# Patient Record
Sex: FEMALE | Race: OTHER
[De-identification: names, ages, dates, MRNs, and addresses within clinical notes are randomized per-mention and may not be internally consistent; named-entity substitution may affect disease eponyms.]

---

## 2017-10-21 ENCOUNTER — HOSPITAL ENCOUNTER (OUTPATIENT)
Dept: HOSPITAL 1 - LB | Age: 37
Discharge: HOME | End: 2017-10-21
Payer: COMMERCIAL

## 2017-10-21 DIAGNOSIS — K76.89: Primary | ICD-10-CM

## 2017-10-21 LAB
CHOLEST SERPL-MCNC: 216 MG/DL (ref ?–200)
CHOLEST/HDLC SERPL: 9.4 MG/DL
HDLC SERPL-MCNC: 23 MG/DL (ref 40–60)
TRIGL SERPL-MCNC: 361 MG/DL (ref ?–150)

## 2018-04-28 ENCOUNTER — HOSPITAL ENCOUNTER (OUTPATIENT)
Dept: HOSPITAL 1 - LB | Age: 38
Discharge: HOME | End: 2018-04-28
Attending: FAMILY MEDICINE
Payer: COMMERCIAL

## 2018-04-28 DIAGNOSIS — F25.1: Primary | ICD-10-CM

## 2018-04-28 DIAGNOSIS — F41.9: ICD-10-CM

## 2018-04-28 LAB
BUN SERPL-MCNC: 10 MG/DL (ref 7–18)
CALCIUM SERPL-MCNC: 8.8 MG/DL (ref 8.5–10.1)
CHLORIDE SERPL-SCNC: 105 MMOL/L (ref 98–107)
CHOLEST SERPL-MCNC: 186 MG/DL (ref ?–200)
CHOLEST/HDLC SERPL: 9.3 MG/DL
CO2 SERPL-SCNC: 28.1 MMOL/L (ref 21–32)
CREAT SERPL-MCNC: 1.2 MG/DL (ref 0.6–1)
GFR SERPLBLD BASED ON 1.73 SQ M-ARVRAT: 53 ML/MIN
GLUCOSE SERPL-MCNC: 108 MG/DL (ref 74–106)
HDLC SERPL-MCNC: 20 MG/DL (ref 40–60)
POTASSIUM SERPL-SCNC: 3.8 MMOL/L (ref 3.5–5.1)
SODIUM SERPL-SCNC: 138 MMOL/L (ref 136–145)
TRIGL SERPL-MCNC: 285 MG/DL (ref ?–150)

## 2018-10-27 ENCOUNTER — HOSPITAL ENCOUNTER (OUTPATIENT)
Dept: HOSPITAL 1 - LB | Age: 38
Discharge: HOME | End: 2018-10-27
Payer: COMMERCIAL

## 2018-10-27 DIAGNOSIS — Z86.39: Primary | ICD-10-CM

## 2018-10-27 LAB
CHOLEST SERPL-MCNC: 176 MG/DL (ref ?–200)
CHOLEST/HDLC SERPL: 7.3 MG/DL
GLUCOSE P FAST SERPL-MCNC: 95 MG/DL (ref 70–110)
HDLC SERPL-MCNC: 24 MG/DL (ref 40–60)
T4 FREE SERPL-MCNC: 1.22 NG/DL (ref 0.76–1.46)
TRIGL SERPL-MCNC: 251 MG/DL (ref ?–150)

## 2018-12-04 ENCOUNTER — HOSPITAL ENCOUNTER (OUTPATIENT)
Dept: HOSPITAL 26 - MLB | Age: 38
Discharge: HOME | End: 2018-12-04
Payer: COMMERCIAL

## 2018-12-04 DIAGNOSIS — D21.9: Primary | ICD-10-CM

## 2018-12-04 LAB
BUN SERPL-MCNC: 11 MG/DL (ref 7–18)
CREAT SERPL-MCNC: 0.8 MG/DL (ref 0.6–1.3)

## 2019-02-02 ENCOUNTER — HOSPITAL ENCOUNTER (OUTPATIENT)
Dept: HOSPITAL 26 - MLB | Age: 39
Discharge: HOME | End: 2019-02-02
Payer: COMMERCIAL

## 2019-02-02 DIAGNOSIS — N83.201: Primary | ICD-10-CM

## 2019-02-03 LAB
ESTRADIOL SERPL-MCNC: 11.2 PG/ML
FSH SERPL-ACNC: 5.9 MIU/ML

## 2019-05-18 ENCOUNTER — HOSPITAL ENCOUNTER (OUTPATIENT)
Dept: HOSPITAL 26 - MLB | Age: 39
Discharge: HOME | End: 2019-05-18
Payer: COMMERCIAL

## 2019-05-18 DIAGNOSIS — F41.9: ICD-10-CM

## 2019-05-18 DIAGNOSIS — F25.1: Primary | ICD-10-CM

## 2019-05-18 LAB
ALBUMIN FLD-MCNC: 3.8 G/DL (ref 3.4–5)
ANION GAP SERPL CALCULATED.3IONS-SCNC: 13.6 MMOL/L (ref 8–16)
AST SERPL-CCNC: 43 U/L (ref 15–37)
BILIRUB SERPL-MCNC: 0.5 MG/DL (ref 0–1)
BUN SERPL-MCNC: 11 MG/DL (ref 7–18)
CHLORIDE SERPL-SCNC: 103 MMOL/L (ref 98–107)
CHOLEST/HDLC SERPL: 8.9 {RATIO} (ref 1–4.5)
CO2 SERPL-SCNC: 26.5 MMOL/L (ref 21–32)
CREAT SERPL-MCNC: 0.9 MG/DL (ref 0.6–1.3)
GFR SERPL CREATININE-BSD FRML MDRD: 90 ML/MIN (ref 90–?)
GLUCOSE SERPL-MCNC: 100 MG/DL (ref 74–106)
HDLC SERPL-MCNC: 26 MG/DL (ref 40–60)
LDLC SERPL CALC-MCNC: 120 MG/DL (ref 60–100)
POTASSIUM SERPL-SCNC: 4.1 MMOL/L (ref 3.5–5.1)
SODIUM SERPL-SCNC: 139 MMOL/L (ref 136–145)
TRIGL SERPL-MCNC: 425 MG/DL (ref 30–150)
TSH SERPL DL<=0.05 MIU/L-ACNC: 1.72 UIU/ML (ref 0.34–3.74)

## 2019-09-28 ENCOUNTER — HOSPITAL ENCOUNTER (OUTPATIENT)
Dept: HOSPITAL 26 - MLB | Age: 39
Discharge: HOME | End: 2019-09-28
Payer: COMMERCIAL

## 2019-09-28 DIAGNOSIS — F25.1: Primary | ICD-10-CM

## 2019-09-28 DIAGNOSIS — F41.9: ICD-10-CM

## 2019-09-28 LAB
CHOLEST/HDLC SERPL: 8.1 {RATIO} (ref 1–4.5)
HDLC SERPL-MCNC: 26 MG/DL (ref 40–60)
LDLC SERPL CALC-MCNC: 133 MG/DL (ref 60–100)
TRIGL SERPL-MCNC: 259 MG/DL (ref 30–150)

## 2019-10-22 ENCOUNTER — HOSPITAL ENCOUNTER (EMERGENCY)
Dept: HOSPITAL 26 - MED | Age: 39
Discharge: HOME | End: 2019-10-22
Payer: COMMERCIAL

## 2019-10-22 VITALS — DIASTOLIC BLOOD PRESSURE: 86 MMHG | SYSTOLIC BLOOD PRESSURE: 132 MMHG

## 2019-10-22 VITALS — SYSTOLIC BLOOD PRESSURE: 132 MMHG | DIASTOLIC BLOOD PRESSURE: 86 MMHG

## 2019-10-22 VITALS — HEIGHT: 68 IN | BODY MASS INDEX: 24.25 KG/M2 | WEIGHT: 160 LBS

## 2019-10-22 DIAGNOSIS — R03.0: ICD-10-CM

## 2019-10-22 DIAGNOSIS — B34.9: Primary | ICD-10-CM

## 2019-10-22 DIAGNOSIS — Z79.1: ICD-10-CM

## 2019-10-22 DIAGNOSIS — Z79.899: ICD-10-CM

## 2019-10-22 NOTE — NUR
PT ARRIVED TO ED C/O COLD SX; BODY ACHES, COUGH, RUNNY NOSE X 3DAYS. PT RATES 
PAIN 3/10 AND DESCRIBES IT AS BODY ACHES. VSS. A & O X4. NOSE IS CONGESTED, 
PRODUCTIVE COUGH. NAUSEA. LUNG SOUNDS CLEAR THROUGHOUT. NO RESP DISTRESS.

PATIENT POSITIONED FOR COMFORT; HOB ELEVATED; BEDRAILS UP X2; BED DOWN. ER MD 
MADE AWARE OF PT STATUS.



NKA.



PMH: SCHIZO, DEPRESSION.

## 2020-01-29 ENCOUNTER — HOSPITAL ENCOUNTER (OUTPATIENT)
Dept: HOSPITAL 26 - MLB | Age: 40
Discharge: HOME | End: 2020-01-29
Payer: COMMERCIAL

## 2020-01-29 DIAGNOSIS — F41.9: Primary | ICD-10-CM

## 2020-01-29 DIAGNOSIS — F25.1: ICD-10-CM

## 2020-01-29 LAB
CHOLEST/HDLC SERPL: 5.9 {RATIO} (ref 1–4.5)
GLUCOSE P FAST SERPL-MCNC: 110 MG/DL (ref 83–110)
HDLC SERPL-MCNC: 31 MG/DL (ref 40–60)
LDLC SERPL CALC-MCNC: 113 MG/DL (ref 60–100)
TRIGL SERPL-MCNC: 197 MG/DL (ref 30–150)

## 2020-07-30 ENCOUNTER — HOSPITAL ENCOUNTER (OUTPATIENT)
Dept: HOSPITAL 26 - MLB | Age: 40
Discharge: HOME | End: 2020-07-30
Payer: COMMERCIAL

## 2020-07-30 DIAGNOSIS — F25.1: ICD-10-CM

## 2020-07-30 DIAGNOSIS — F41.9: Primary | ICD-10-CM

## 2020-07-30 LAB
ALBUMIN FLD-MCNC: 3.8 G/DL (ref 3.4–5)
ANION GAP SERPL CALCULATED.3IONS-SCNC: 15 MMOL/L (ref 8–16)
AST SERPL-CCNC: 25 U/L (ref 15–37)
BASOPHILS # BLD AUTO: 0.1 K/UL (ref 0–0.22)
BASOPHILS NFR BLD AUTO: 0.8 % (ref 0–2)
BILIRUB SERPL-MCNC: 0.3 MG/DL (ref 0–1)
BUN SERPL-MCNC: 9 MG/DL (ref 7–18)
CHLORIDE SERPL-SCNC: 105 MMOL/L (ref 98–107)
CHOLEST/HDLC SERPL: 7.6 {RATIO} (ref 1–4.5)
CO2 SERPL-SCNC: 23.8 MMOL/L (ref 21–32)
CREAT SERPL-MCNC: 0.9 MG/DL (ref 0.6–1.3)
EOSINOPHIL # BLD AUTO: 0.1 K/UL (ref 0–0.4)
EOSINOPHIL NFR BLD AUTO: 1.9 % (ref 0–4)
ERYTHROCYTE [DISTWIDTH] IN BLOOD BY AUTOMATED COUNT: 13.9 % (ref 11.6–13.7)
GFR SERPL CREATININE-BSD FRML MDRD: 89 ML/MIN (ref 90–?)
GLUCOSE SERPL-MCNC: 138 MG/DL (ref 74–106)
HCT VFR BLD AUTO: 36 % (ref 36–48)
HDLC SERPL-MCNC: 22 MG/DL (ref 40–60)
HGB BLD-MCNC: 11.9 G/DL (ref 12–16)
LDLC SERPL CALC-MCNC: 103 MG/DL (ref 60–100)
LYMPHOCYTES # BLD AUTO: 1.5 K/UL (ref 2.5–16.5)
LYMPHOCYTES NFR BLD AUTO: 22.3 % (ref 20.5–51.1)
MCH RBC QN AUTO: 28 PG (ref 27–31)
MCHC RBC AUTO-ENTMCNC: 33 G/DL (ref 33–37)
MCV RBC AUTO: 85.2 FL (ref 80–94)
MONOCYTES # BLD AUTO: 0.5 K/UL (ref 0.8–1)
MONOCYTES NFR BLD AUTO: 7.8 % (ref 1.7–9.3)
NEUTROPHILS # BLD AUTO: 4.4 K/UL (ref 1.8–7.7)
NEUTROPHILS NFR BLD AUTO: 67.2 % (ref 42.2–75.2)
PLATELET # BLD AUTO: 212 K/UL (ref 140–450)
POTASSIUM SERPL-SCNC: 3.8 MMOL/L (ref 3.5–5.1)
RBC # BLD AUTO: 4.22 MIL/UL (ref 4.2–5.4)
SODIUM SERPL-SCNC: 140 MMOL/L (ref 136–145)
TRIGL SERPL-MCNC: 211 MG/DL (ref 30–150)
TSH SERPL DL<=0.05 MIU/L-ACNC: 3.94 UIU/ML (ref 0.34–3.74)
WBC # BLD AUTO: 6.6 K/UL (ref 4.8–10.8)

## 2020-08-12 ENCOUNTER — HOSPITAL ENCOUNTER (EMERGENCY)
Dept: HOSPITAL 26 - MED | Age: 40
Discharge: HOME | End: 2020-08-12
Payer: COMMERCIAL

## 2020-08-12 VITALS — BODY MASS INDEX: 25.46 KG/M2 | HEIGHT: 68 IN | WEIGHT: 168 LBS

## 2020-08-12 VITALS — SYSTOLIC BLOOD PRESSURE: 124 MMHG | DIASTOLIC BLOOD PRESSURE: 78 MMHG

## 2020-08-12 VITALS — DIASTOLIC BLOOD PRESSURE: 78 MMHG | SYSTOLIC BLOOD PRESSURE: 124 MMHG

## 2020-08-12 DIAGNOSIS — N75.0: Primary | ICD-10-CM

## 2020-08-12 DIAGNOSIS — Z79.899: ICD-10-CM

## 2020-08-12 PROCEDURE — 56420 I&D BARTHOLINS GLAND ABSCESS: CPT

## 2020-08-12 PROCEDURE — 99284 EMERGENCY DEPT VISIT MOD MDM: CPT

## 2020-08-12 NOTE — NUR
Patient discharged with v/s stable. Written and verbal after care instructions 
given and explained. 

Patient alert, oriented and verbalized understanding of instructions. 
Ambulatory with steady gait. All questions addressed prior to discharge. ID 
band removed. Patient advised to follow up with PMD. Rx of BACTRIM given. 
Patient educated on indication of medication including possible reaction and 
side effects. Opportunity to ask questions provided and answered.

## 2020-08-12 NOTE — NUR
39 Y/O FEMALE C/O VAGINAL TENDERNESS AND SWELLING AND PAIN, S/P VAGINAL 
INTERCOURSE ON SUNDAY. PT DENIES ANY CYST OR DISRUPTION OF SKIN. DENIES ANY 
SOB/N/V/D. DENIES ANY VAGINAL DISCHARGE. VSS.

## 2020-08-14 ENCOUNTER — HOSPITAL ENCOUNTER (EMERGENCY)
Dept: HOSPITAL 26 - MED | Age: 40
Discharge: HOME | End: 2020-08-14
Payer: COMMERCIAL

## 2020-08-14 VITALS — BODY MASS INDEX: 21.98 KG/M2 | HEIGHT: 68 IN | WEIGHT: 145 LBS

## 2020-08-14 VITALS — DIASTOLIC BLOOD PRESSURE: 82 MMHG | SYSTOLIC BLOOD PRESSURE: 133 MMHG

## 2020-08-14 DIAGNOSIS — F32.9: ICD-10-CM

## 2020-08-14 DIAGNOSIS — Z48.00: ICD-10-CM

## 2020-08-14 DIAGNOSIS — N75.1: Primary | ICD-10-CM

## 2020-08-14 DIAGNOSIS — E03.9: ICD-10-CM

## 2020-08-14 DIAGNOSIS — Z79.899: ICD-10-CM

## 2020-08-14 NOTE — NUR
PT C/O LEFT-SIDED BARTHOLIN'S CATHETER FELL OUT THIS AFTERNOON. REPORTS HAD 
BARTHOLIN GLAND I&D ON 08/12/2020. ALSO C/O 7/10 PAIN, EDEMA, AND BLOODY 
DRAINAGE ON LEFT BARTHOLIN GLAND AT THIS TIME. PT HAS BEEN ON BACTRIM BID SINCE 
08/12/2020. AAOX4 WITH EVEN AND STEADY GAIT; PT DENIES ANY FEVER, CP, SOB, OR 
COUGH AT THIS TIME; PATIENT STATES PAIN OF 7/10 AT THIS TIME; VSS; PATIENT 
POSITIONED FOR COMFORT; HOB ELEVATED; BEDRAILS UP X1; BED DOWN. ER MD MADE 
AWARE OF PT STATUS.

## 2020-10-05 ENCOUNTER — HOSPITAL ENCOUNTER (EMERGENCY)
Dept: HOSPITAL 26 - MED | Age: 40
Discharge: HOME | End: 2020-10-05
Payer: COMMERCIAL

## 2020-10-05 VITALS — WEIGHT: 170 LBS | HEIGHT: 68 IN | BODY MASS INDEX: 25.76 KG/M2

## 2020-10-05 VITALS — DIASTOLIC BLOOD PRESSURE: 76 MMHG | SYSTOLIC BLOOD PRESSURE: 122 MMHG

## 2020-10-05 VITALS — SYSTOLIC BLOOD PRESSURE: 122 MMHG | DIASTOLIC BLOOD PRESSURE: 76 MMHG

## 2020-10-05 DIAGNOSIS — I10: ICD-10-CM

## 2020-10-05 DIAGNOSIS — N75.0: Primary | ICD-10-CM

## 2020-10-05 DIAGNOSIS — E07.9: ICD-10-CM

## 2020-10-05 DIAGNOSIS — F20.9: ICD-10-CM

## 2020-10-05 DIAGNOSIS — F32.9: ICD-10-CM

## 2020-10-05 DIAGNOSIS — Z79.899: ICD-10-CM

## 2020-10-05 PROCEDURE — 99283 EMERGENCY DEPT VISIT LOW MDM: CPT

## 2020-10-05 PROCEDURE — 96372 THER/PROPH/DIAG INJ SC/IM: CPT

## 2020-10-05 NOTE — NUR
39 y/o female from home c/o cyst to left labia. Pt states she had bartholins 
cyst removed 1 month ago and c/o same pain. 8/10 burning pain to labia. Slight 
swelling noted at this time. Skin warm, dry, intact. Pt denies drainage from 
area.

## 2020-10-05 NOTE — NUR
Patient discharged with v/s stable. Written and verbal after care instructions 
given and explained. 

Patient alert, oriented and verbalized understanding of instructions. 
Ambulatory with steady gait. All questions addressed prior to discharge. ID 
band removed. Patient advised to follow up with PMD. Rx of Bactrim 800mg and 
Tramadol 50mg given. Patient educated on indication of medication including 
possible reaction and side effects. Opportunity to ask questions provided and 
answered.

## 2020-10-07 ENCOUNTER — HOSPITAL ENCOUNTER (EMERGENCY)
Dept: HOSPITAL 26 - MED | Age: 40
Discharge: HOME | End: 2020-10-07
Payer: COMMERCIAL

## 2020-10-07 VITALS — HEIGHT: 68 IN | BODY MASS INDEX: 26.83 KG/M2 | WEIGHT: 177 LBS

## 2020-10-07 VITALS — SYSTOLIC BLOOD PRESSURE: 132 MMHG | DIASTOLIC BLOOD PRESSURE: 85 MMHG

## 2020-10-07 DIAGNOSIS — I10: ICD-10-CM

## 2020-10-07 DIAGNOSIS — E03.9: ICD-10-CM

## 2020-10-07 DIAGNOSIS — Z90.721: ICD-10-CM

## 2020-10-07 DIAGNOSIS — N75.0: Primary | ICD-10-CM

## 2020-10-07 DIAGNOSIS — Z79.899: ICD-10-CM

## 2020-10-07 DIAGNOSIS — Z98.890: ICD-10-CM

## 2020-10-07 PROCEDURE — 56420 I&D BARTHOLINS GLAND ABSCESS: CPT

## 2020-10-07 PROCEDURE — 96372 THER/PROPH/DIAG INJ SC/IM: CPT

## 2020-10-07 PROCEDURE — 99284 EMERGENCY DEPT VISIT MOD MDM: CPT

## 2020-10-07 RX ADMIN — KETOROLAC TROMETHAMINE ONE MG: 30 INJECTION, SOLUTION INTRAMUSCULAR; INTRAVENOUS at 19:19

## 2020-10-07 RX ADMIN — LIDOCAINE HYDROCHLORIDE ONE MG: 10 INJECTION, SOLUTION EPIDURAL; INFILTRATION; INTRACAUDAL; PERINEURAL at 19:36

## 2020-10-07 NOTE — NUR
PT SEEN WITH C/O OF VAGINAL CYST WITH PAIN 7/10. A&O, LUNGS CLEAR, NO OTHER 
PAIN OR SOB. PT HAS NO OTHER COMPLAINTS, RESTING COMOFRTABLY AT THIS TIME.

## 2020-10-10 ENCOUNTER — HOSPITAL ENCOUNTER (OUTPATIENT)
Dept: HOSPITAL 26 - MLB | Age: 40
Discharge: HOME | End: 2020-10-10
Payer: COMMERCIAL

## 2020-10-10 DIAGNOSIS — E78.1: ICD-10-CM

## 2020-10-10 DIAGNOSIS — E03.9: Primary | ICD-10-CM

## 2020-10-10 DIAGNOSIS — D64.9: ICD-10-CM

## 2020-10-10 DIAGNOSIS — E55.9: ICD-10-CM

## 2020-10-10 LAB
APPEARANCE UR: (no result)
BASOPHILS # BLD AUTO: 0 K/UL (ref 0–0.22)
BASOPHILS NFR BLD AUTO: 0.8 % (ref 0–2)
BILIRUB UR QL STRIP: (no result)
CHOLEST/HDLC SERPL: 6.6 {RATIO} (ref 1–4.5)
COLOR UR: (no result)
EOSINOPHIL # BLD AUTO: 0.1 K/UL (ref 0–0.4)
EOSINOPHIL NFR BLD AUTO: 2.8 % (ref 0–4)
ERYTHROCYTE [DISTWIDTH] IN BLOOD BY AUTOMATED COUNT: 13.8 % (ref 11.6–13.7)
GLUCOSE UR STRIP-MCNC: NEGATIVE MG/DL
HCT VFR BLD AUTO: 36.7 % (ref 36–48)
HDLC SERPL-MCNC: 23 MG/DL (ref 40–60)
HGB BLD-MCNC: 12.1 G/DL (ref 12–16)
HGB UR QL STRIP: (no result)
IRON SERPL-MCNC: 71 UG/DL (ref 50–175)
LDLC SERPL CALC-MCNC: 93 MG/DL (ref 60–100)
LEUKOCYTE ESTERASE UR QL STRIP: NEGATIVE
LYMPHOCYTES # BLD AUTO: 1.3 K/UL (ref 2.5–16.5)
LYMPHOCYTES NFR BLD AUTO: 25.8 % (ref 20.5–51.1)
MCH RBC QN AUTO: 28 PG (ref 27–31)
MCHC RBC AUTO-ENTMCNC: 33 G/DL (ref 33–37)
MCV RBC AUTO: 85.6 FL (ref 80–94)
MONOCYTES # BLD AUTO: 0.4 K/UL (ref 0.8–1)
MONOCYTES NFR BLD AUTO: 9.2 % (ref 1.7–9.3)
NEUTROPHILS # BLD AUTO: 3 K/UL (ref 1.8–7.7)
NEUTROPHILS NFR BLD AUTO: 61.4 % (ref 42.2–75.2)
NITRITE UR QL STRIP: NEGATIVE
PH UR STRIP: 5.5 [PH] (ref 5–9)
PLATELET # BLD AUTO: 224 K/UL (ref 140–450)
RBC # BLD AUTO: 4.28 MIL/UL (ref 4.2–5.4)
RBC #/AREA URNS HPF: (no result) /HPF (ref 0–5)
TRIGL SERPL-MCNC: 180 MG/DL (ref 30–150)
TSH SERPL DL<=0.05 MIU/L-ACNC: 0.55 UIU/ML (ref 0.34–3.74)
WBC # BLD AUTO: 4.9 K/UL (ref 4.8–10.8)
WBC,URINE: (no result) /HPF (ref 0–5)

## 2020-10-11 LAB
FOLATE SERPL-MCNC: 16.3 NG/ML (ref 3–?)
T4 FREE SERPL-MCNC: 1.63 NG/DL (ref 0.82–1.77)
VIT B12 SERPL-MCNC: 628 PG/ML (ref 232–1245)